# Patient Record
Sex: FEMALE | Race: WHITE | NOT HISPANIC OR LATINO | Employment: FULL TIME | URBAN - METROPOLITAN AREA
[De-identification: names, ages, dates, MRNs, and addresses within clinical notes are randomized per-mention and may not be internally consistent; named-entity substitution may affect disease eponyms.]

---

## 2017-01-30 ENCOUNTER — OFFICE VISIT (OUTPATIENT)
Dept: OBSTETRICS AND GYNECOLOGY | Facility: CLINIC | Age: 27
End: 2017-01-30
Payer: COMMERCIAL

## 2017-01-30 VITALS
BODY MASS INDEX: 21.63 KG/M2 | SYSTOLIC BLOOD PRESSURE: 114 MMHG | DIASTOLIC BLOOD PRESSURE: 74 MMHG | HEIGHT: 67 IN | WEIGHT: 137.81 LBS

## 2017-01-30 DIAGNOSIS — Z12.4 SCREENING FOR MALIGNANT NEOPLASM OF THE CERVIX: ICD-10-CM

## 2017-01-30 DIAGNOSIS — Z01.419 ROUTINE GYNECOLOGICAL EXAMINATION: Primary | ICD-10-CM

## 2017-01-30 PROCEDURE — 99999 PR PBB SHADOW E&M-EST. PATIENT-LVL II: CPT | Mod: PBBFAC,,, | Performed by: OBSTETRICS & GYNECOLOGY

## 2017-01-30 PROCEDURE — 88175 CYTOPATH C/V AUTO FLUID REDO: CPT

## 2017-01-30 PROCEDURE — 99395 PREV VISIT EST AGE 18-39: CPT | Mod: S$GLB,,, | Performed by: OBSTETRICS & GYNECOLOGY

## 2017-01-30 RX ORDER — CETIRIZINE HYDROCHLORIDE 10 MG/1
10 TABLET ORAL DAILY
COMMUNITY
End: 2018-05-04

## 2017-01-30 RX ORDER — NORGESTIMATE AND ETHINYL ESTRADIOL 0.25-0.035
1 KIT ORAL DAILY
Qty: 84 TABLET | Refills: 3 | Status: SHIPPED | OUTPATIENT
Start: 2017-01-30 | End: 2017-12-11 | Stop reason: SDUPTHER

## 2017-01-30 NOTE — PROGRESS NOTES
"OBSTETRIC HISTORY:   OB History      Para Term  AB TAB SAB Ectopic Multiple Living    0                  COMPREHENSIVE GYN HISTORY:  PAP History: Denies abnormal Paps.  Infection History: Reports STDs: Genital warts. Denies PID.  Benign History: Denies uterine fibroids. Denies ovarian cysts. Denies endometriosis.   Cancer History: Denies cervical cancer. Denies uterine cancer or hyperplasia. Denies ovarian cancer. Denies vulvar cancer or pre-cancer. Denies vaginal cancer or pre-cancer. Denies breast cancer. Denies colon cancer.  Sexual Activity History:  reports that she currently engages in sexual activity.   Menstrual History:  Every 28 days, flows for 4 days. Moderate flow.  Dysmenorrhea History: Reports severe dysmenorrhea.       HPI:   26 y.o.  Patient's last menstrual period was 2017.   Patient is  here for her annual gynecologic exam.  She has no complaints. She denies bladder, breast and bowel complaints.    ROS:  GENERAL: Denies weight gain or weight loss. Feeling well overall.   SKIN: Denies rash or lesions.   HEAD: Denies headache.   NODES: Denies enlarged lymph nodes.   CHEST: Denies shortness of breath.   ABDOMEN: No abdominal pain, constipation, diarrhea, nausea, vomiting or rectal bleeding.   URINARY: No frequency, dysuria, hematuria, or burning on urination.  REPRODUCTIVE: See HPI.   BREASTS: The patient denies pain, lumps, or nipple discharge.   HEMATOLOGIC: No easy bruisability.   MUSCULOSKELETAL: Denies joint pain or back pain.   NEUROLOGIC: Denies weakness.   PSYCHIATRIC: Denies depression, anxiety or mood swings.    PE:   Visit Vitals    /74    Ht 5' 7" (1.702 m)    Wt 62.5 kg (137 lb 12.6 oz)    LMP 2017    BMI 21.58 kg/m2     APPEARANCE: Well nourished, well developed, in no acute distress.  NECK: Neck symmetric without  thyromegaly.  NODES: No inguinal, cervical lymph node enlargement.  CHEST: Lungs clear to auscultation.  HEART: Regular rate and rhythm, " no murmurs, rubs or gallops.  ABDOMEN: Soft. No tenderness or masses. No hernias.  BREASTS: Symmetrical, no skin changes or visible lesions. No palpable masses, nipple discharge or adenopathy bilaterally.  PELVIC:   VULVA: No lesions. Normal female genitalia.  URETHRAL MEATUS: Normal size and location, no lesions, no prolapse.  URETHRA: No masses, tenderness, prolapse or scarring.  VAGINA: Moist and well rugated, no discharge, no significant cystocele or rectocele.  CERVIX: No lesions and discharge.  UTERUS: Normal size, regular shape, mobile, non-tender, bladder base nontender.  ADNEXA: No masses or tenderness.    PROCEDURES:  Pap smear    Assessment:  Normal Gynecologic Exam    Plan:  Mammogram and Colonoscopy if indicated by current recommendations.  Return to clinic in one year or for any problems or complaints.    Counseling:  Patient was counseled today on A.C.S. Pap guidelines and recommendations for yearly pelvic exams and monthly self breast exams; to see her PCP for other health maintenance. Regular exercise and healthy diet.

## 2017-12-11 RX ORDER — NORGESTIMATE AND ETHINYL ESTRADIOL 0.25-0.035
1 KIT ORAL DAILY
Qty: 84 TABLET | Refills: 0 | Status: SHIPPED | OUTPATIENT
Start: 2017-12-11 | End: 2018-02-02

## 2017-12-11 NOTE — TELEPHONE ENCOUNTER
Spoke to patient informed her that OCPs were sent to her pharmacy, informed her that her annual was due in two months if she wanted to set up appointment, refuse and said she will set one up via patient portal, I did inform the patient that is she did not schedule an annual you will not be able to send any more refills.

## 2017-12-11 NOTE — TELEPHONE ENCOUNTER
Received request for 3 month refill of OCP and due for annual in about 2 months. Needs to make appt soon.

## 2018-02-02 ENCOUNTER — OFFICE VISIT (OUTPATIENT)
Dept: OBSTETRICS AND GYNECOLOGY | Facility: CLINIC | Age: 28
End: 2018-02-02
Payer: COMMERCIAL

## 2018-02-02 VITALS
WEIGHT: 136.69 LBS | SYSTOLIC BLOOD PRESSURE: 141 MMHG | DIASTOLIC BLOOD PRESSURE: 90 MMHG | HEIGHT: 67 IN | BODY MASS INDEX: 21.45 KG/M2

## 2018-02-02 DIAGNOSIS — Z01.419 WELL WOMAN EXAM WITH ROUTINE GYNECOLOGICAL EXAM: Primary | ICD-10-CM

## 2018-02-02 DIAGNOSIS — Z12.4 SCREENING FOR CERVICAL CANCER: ICD-10-CM

## 2018-02-02 PROCEDURE — 99999 PR PBB SHADOW E&M-EST. PATIENT-LVL III: CPT | Mod: PBBFAC,,, | Performed by: OBSTETRICS & GYNECOLOGY

## 2018-02-02 PROCEDURE — 99395 PREV VISIT EST AGE 18-39: CPT | Mod: S$GLB,,, | Performed by: OBSTETRICS & GYNECOLOGY

## 2018-02-02 PROCEDURE — 88175 CYTOPATH C/V AUTO FLUID REDO: CPT

## 2018-02-02 RX ORDER — NORETHINDRONE ACETATE AND ETHINYL ESTRADIOL 1MG-20(21)
1 KIT ORAL DAILY
Qty: 28 TABLET | Refills: 2 | Status: SHIPPED | OUTPATIENT
Start: 2018-02-02 | End: 2021-10-19 | Stop reason: CLARIF

## 2018-02-02 NOTE — PROGRESS NOTES
OBSTETRIC HISTORY:   OB History      Para Term  AB Living    0              SAB TAB Ectopic Multiple Live Births                      COMPREHENSIVE GYN HISTORY:  PAP History: Denies abnormal Paps.  Infection History: Reports STDs: Genital warts. Denies PID.  Benign History: Denies uterine fibroids. Denies ovarian cysts. Denies endometriosis.   Cancer History: Denies cervical cancer. Denies uterine cancer or hyperplasia. Denies ovarian cancer. Denies vulvar cancer or pre-cancer. Denies vaginal cancer or pre-cancer. Denies breast cancer. Denies colon cancer.  Sexual Activity History:  reports that she currently engages in sexual activity.   Menstrual History:  Every 28 days, flows for 4 days. Moderate flow.  Dysmenorrhea History: Reports severe dysmenorrhea.      HPI:   27 y.o.  Patient's last menstrual period was 2018 (approximate).   Patient is  here for her annual gynecologic exam.  She has no complaints but her blood pressure was elevated. States it was elevated when she had to have her toe drained but assumed it was from pain. Initial /90 then 160/100 then final BP was 134/86. +history of HTN. Discussed options including trying a lower dose pill and check BP in 3 months, IUD, nexplanon, condoms or even Progestin pill. She denies bladder, breast and bowel complaints.    ROS:  GENERAL: Denies weight gain or weight loss. Feeling well overall.   SKIN: Denies rash or lesions.   HEAD: Denies headache.   NODES: Denies enlarged lymph nodes.   CHEST: Denies shortness of breath.   ABDOMEN: No abdominal pain, constipation, diarrhea, nausea, vomiting or rectal bleeding.   URINARY: No frequency, dysuria, hematuria, or burning on urination.  REPRODUCTIVE: See HPI.   BREASTS: The patient denies pain, lumps, or nipple discharge.   HEMATOLOGIC: No easy bruisability.   MUSCULOSKELETAL: Denies joint pain or back pain.   NEUROLOGIC: Denies weakness.   PSYCHIATRIC: Denies depression, anxiety or mood  "swings.    PE:   BP (!) 141/90   Ht 5' 7" (1.702 m)   Wt 62 kg (136 lb 11 oz)   LMP 01/17/2018 (Approximate)   BMI 21.41 kg/m²   APPEARANCE: Well nourished, well developed, in no acute distress.  NECK: Neck symmetric without  thyromegaly.  NODES: No inguinal, cervical lymph node enlargement.  CHEST: Lungs clear to auscultation.  HEART: Regular rate and rhythm, no murmurs, rubs or gallops.  ABDOMEN: Soft. No tenderness or masses. No hernias.  BREASTS: Symmetrical, no skin changes or visible lesions. No palpable masses, nipple discharge or adenopathy bilaterally.  PELVIC:   VULVA: No lesions. Normal female genitalia.  URETHRAL MEATUS: Normal size and location, no lesions, no prolapse.  URETHRA: No masses, tenderness, prolapse or scarring.  VAGINA: Moist and well rugated, no discharge, no significant cystocele or rectocele.  CERVIX: No lesions and discharge.  UTERUS: Normal size, regular shape, mobile, non-tender, bladder base nontender.  ADNEXA: No masses or tenderness.    PROCEDURES:  Pap smear    Assessment:  Normal Gynecologic Exam  Questionable HTN--will start low dose OCP and RTO 3 months to recheck BP. If gets headaches will need to check BP at home.    Plan:  Mammogram and Colonoscopy if indicated by current recommendations.  Return to clinic in one year or for any problems or complaints.    Counseling:  Patient was counseled today on A.C.S. Pap guidelines and recommendations for yearly pelvic exams and monthly self breast exams; to see her PCP for other health maintenance. Regular exercise and healthy diet.          "

## 2018-03-06 ENCOUNTER — TELEPHONE (OUTPATIENT)
Dept: OBSTETRICS AND GYNECOLOGY | Facility: CLINIC | Age: 28
End: 2018-03-06

## 2018-03-06 RX ORDER — NORGESTIMATE AND ETHINYL ESTRADIOL 0.25-0.035
1 KIT ORAL DAILY
Qty: 84 TABLET | Refills: 3 | OUTPATIENT
Start: 2018-03-06

## 2018-03-06 RX ORDER — NORETHINDRONE ACETATE AND ETHINYL ESTRADIOL 1MG-20(21)
1 KIT ORAL DAILY
Qty: 28 TABLET | Refills: 2 | Status: CANCELLED | OUTPATIENT
Start: 2018-03-06 | End: 2019-03-06

## 2018-03-06 RX ORDER — NORETHINDRONE ACETATE AND ETHINYL ESTRADIOL 1MG-20(21)
1 KIT ORAL DAILY
Qty: 84 TABLET | Refills: 3 | Status: CANCELLED | OUTPATIENT
Start: 2018-03-06

## 2018-03-06 NOTE — TELEPHONE ENCOUNTER
Please verify correct medication because Estrylla was requested medication but Junel was sent in February

## 2018-03-06 NOTE — TELEPHONE ENCOUNTER
Spoke with [facility] pharmacy.  The Estrylla was an old prescription that was on an automatic refill.   Pharmacy is able to see that patient picked up Junel Fe 1/20 refill recently and in February at another [facility]

## 2018-03-06 NOTE — TELEPHONE ENCOUNTER
E-Prescribing Error received for 90 refill request for patient birthcontrol pills.  Patient was recently seen on 2/2/2018 for her annual exam.  A prescription was sent for 28 day supply with only 2 additional refills; patient is requesting a 90 days supply.  Please advise

## 2018-03-06 NOTE — TELEPHONE ENCOUNTER
Sorry just realized why only given one pack. Blood pressures were very elevated at appointment and that is why we switched OCP. Patient is to return late April early May for blood pressure check.

## 2018-04-10 ENCOUNTER — PATIENT MESSAGE (OUTPATIENT)
Dept: OBSTETRICS AND GYNECOLOGY | Facility: CLINIC | Age: 28
End: 2018-04-10

## 2018-04-10 RX ORDER — NORETHINDRONE ACETATE AND ETHINYL ESTRADIOL 1MG-20(21)
1 KIT ORAL DAILY
Qty: 28 TABLET | Refills: 2 | Status: CANCELLED | OUTPATIENT
Start: 2018-04-10 | End: 2019-04-10

## 2018-04-27 ENCOUNTER — PATIENT MESSAGE (OUTPATIENT)
Dept: OBSTETRICS AND GYNECOLOGY | Facility: CLINIC | Age: 28
End: 2018-04-27

## 2018-04-27 NOTE — TELEPHONE ENCOUNTER
Pt came in for BP check today. Initial systolic BP was 162, I didn't finish to get diastolic. I let patient rest in a quiet room for about 10 minutes and rechecked. The second BP was 152/98. Pt notified that she should hear something Monday about what the next step was. Pt states she has two active pills left and then a week of placebo pills.

## 2018-04-27 NOTE — TELEPHONE ENCOUNTER
----- Message from Ilsa Edwards sent at 4/27/2018  9:31 AM CDT -----  Contact: Self/ [phone]  Patient called in to be seen today if possible. Patient was told her blood pressure needed to be checked before allowing refill medication.    Please call and advise.     norethindrone-ethinyl estradiol (JUNEL FE 1/20) 1 mg-20 mcg (21)/75 mg (7) per tablet

## 2018-04-29 NOTE — TELEPHONE ENCOUNTER
Notify blood pressure still too high for combination birth control. Options would be implantable jaison, iud, condoms, progestin only pill ot Depo provera.

## 2018-04-30 ENCOUNTER — PATIENT MESSAGE (OUTPATIENT)
Dept: OBSTETRICS AND GYNECOLOGY | Facility: CLINIC | Age: 28
End: 2018-04-30

## 2018-05-01 RX ORDER — NORETHINDRONE ACETATE AND ETHINYL ESTRADIOL 1MG-20(21)
1 KIT ORAL DAILY
Qty: 28 TABLET | Refills: 2 | OUTPATIENT
Start: 2018-05-01 | End: 2019-05-01

## 2018-05-04 ENCOUNTER — OFFICE VISIT (OUTPATIENT)
Dept: OBSTETRICS AND GYNECOLOGY | Facility: CLINIC | Age: 28
End: 2018-05-04
Payer: COMMERCIAL

## 2018-05-04 VITALS
BODY MASS INDEX: 20.83 KG/M2 | WEIGHT: 132.69 LBS | DIASTOLIC BLOOD PRESSURE: 84 MMHG | HEIGHT: 67 IN | SYSTOLIC BLOOD PRESSURE: 120 MMHG

## 2018-05-04 DIAGNOSIS — Z30.09 ENCOUNTER FOR COUNSELING REGARDING INITIATION OF OTHER CONTRACEPTIVE MEASURE: Primary | ICD-10-CM

## 2018-05-04 PROCEDURE — 99999 PR PBB SHADOW E&M-EST. PATIENT-LVL III: CPT | Mod: PBBFAC,,, | Performed by: OBSTETRICS & GYNECOLOGY

## 2018-05-04 PROCEDURE — 99211 OFF/OP EST MAY X REQ PHY/QHP: CPT | Mod: S$GLB,,, | Performed by: OBSTETRICS & GYNECOLOGY

## 2018-05-04 NOTE — PROGRESS NOTES
"OBSTETRIC HISTORY:   OB History      Para Term  AB Living    0              SAB TAB Ectopic Multiple Live Births                      COMPREHENSIVE GYN HISTORY:  PAP History: Denies abnormal Paps.  Infection History: Reports STDs: Genital warts. Denies PID.  Benign History: Denies uterine fibroids. Denies ovarian cysts. Denies endometriosis.   Cancer History: Denies cervical cancer. Denies uterine cancer or hyperplasia. Denies ovarian cancer. Denies vulvar cancer or pre-cancer. Denies vaginal cancer or pre-cancer. Denies breast cancer. Denies colon cancer.  Sexual Activity History:  reports that she currently engages in sexual activity.   Menstrual History:  Every 28 days, flows for 4 days. Moderate flow.  Dysmenorrhea History: Reports severe dysmenorrhea.      HPI:   27 y.o.  Patient's last menstrual period was 2018.   Patient is  here to discuss birth control with recent elevations in blood pressure. Today it is fine but when she gets stressed it is very elevated and discussed that would have same high risks as having it all the time. After discussing different birth control she would like the Nexplanon.  She denies bladder, breast and bowel complaints.    ROS:  GENERAL: Denies weight gain or weight loss. Feeling well overall.   SKIN: Denies rash or lesions.   HEAD: Denies headache.   NODES: Denies enlarged lymph nodes.   CHEST: Denies shortness of breath.   ABDOMEN: No abdominal pain, constipation, diarrhea, nausea, vomiting or rectal bleeding.   URINARY: No frequency, dysuria, hematuria, or burning on urination.  REPRODUCTIVE: See HPI.   BREASTS: The patient denies pain, lumps, or nipple discharge.   HEMATOLOGIC: No easy bruisability.   MUSCULOSKELETAL: Denies joint pain or back pain.   NEUROLOGIC: Denies weakness.   PSYCHIATRIC: Denies depression, anxiety or mood swings.    PE:   /84 (BP Location: Right arm)   Ht 5' 7" (1.702 m)   Wt 60.2 kg (132 lb 11.5 oz)   LMP 2018   BMI " 20.79 kg/m²   APPEARANCE: Well nourished, well developed, in no acute distress.  Talk only 5 minutes      ASSESSMENT:  Contraceptive counseling    PLAN:  RTO once product is received.

## 2018-05-11 ENCOUNTER — PATIENT MESSAGE (OUTPATIENT)
Dept: OBSTETRICS AND GYNECOLOGY | Facility: CLINIC | Age: 28
End: 2018-05-11

## 2021-04-15 ENCOUNTER — PATIENT MESSAGE (OUTPATIENT)
Dept: RESEARCH | Facility: HOSPITAL | Age: 31
End: 2021-04-15

## 2022-04-28 LAB — ANTE RHIG: NORMAL

## 2024-11-04 ENCOUNTER — PATIENT MESSAGE (OUTPATIENT)
Dept: PRIMARY CARE CLINIC | Facility: CLINIC | Age: 34
End: 2024-11-04

## 2024-11-04 ENCOUNTER — OFFICE VISIT (OUTPATIENT)
Dept: PRIMARY CARE CLINIC | Facility: CLINIC | Age: 34
End: 2024-11-04
Payer: COMMERCIAL

## 2024-11-04 VITALS
HEIGHT: 66 IN | HEART RATE: 76 BPM | SYSTOLIC BLOOD PRESSURE: 128 MMHG | OXYGEN SATURATION: 98 % | WEIGHT: 146.19 LBS | DIASTOLIC BLOOD PRESSURE: 72 MMHG | BODY MASS INDEX: 23.49 KG/M2

## 2024-11-04 DIAGNOSIS — F41.1 GAD (GENERALIZED ANXIETY DISORDER): ICD-10-CM

## 2024-11-04 DIAGNOSIS — Z00.00 WELLNESS EXAMINATION: Primary | ICD-10-CM

## 2024-11-04 PROCEDURE — 99999 PR PBB SHADOW E&M-EST. PATIENT-LVL III: CPT | Mod: PBBFAC,,, | Performed by: INTERNAL MEDICINE

## 2024-11-04 RX ORDER — ALPRAZOLAM 0.25 MG/1
0.25 TABLET ORAL DAILY PRN
Qty: 30 TABLET | Refills: 0 | Status: SHIPPED | OUTPATIENT
Start: 2024-11-04 | End: 2024-12-04

## 2024-11-04 RX ORDER — SERTRALINE HYDROCHLORIDE 25 MG/1
25 TABLET, FILM COATED ORAL DAILY
Qty: 90 TABLET | Refills: 3 | Status: SHIPPED | OUTPATIENT
Start: 2024-11-04 | End: 2025-11-04

## 2024-11-04 NOTE — PROGRESS NOTES
[facility] Note    Chief Complaint      Chief Complaint   Patient presents with    Annual Exam    Depression    Establish Care     History of Present Illness      Radha Colvin is a 34 y.o. female who presents today for chief complaint annual wellness . HPI   Annual feels well, prior PCP Adelina No   DARRIN she feels worke dup and shaky at times, she has used someone elses prn clonazapm which has helped. She has been on celexa in the past but dc'd after only a few weeks due to pregnancy, she felt it reduced her tolerance to alcohol, shes had some big life changes, she had a baby, she quit her job. She feels her worst perimenstrual, some component of PMDD    Gyn Herbert 3.23    Active Problem List with Overview Notes    Diagnosis Date Noted    Tender lymph node 2015       Health Maintenance   Topic Date Due    TETANUS VACCINE  06/15/2032    Hepatitis C Screening  Completed    Lipid Panel  Completed       Past Medical History:   Diagnosis Date    Convulsions 2020 1:57:24 PM    [facility] Historical - Unknown: Seizure-No Additional Notes    Convulsions 2020 1:57:24 PM    [facility] Historical - Unknown: Seizure-No Additional Notes    Depression     Essential hypertension 2020 1:57:15 PM    [facility] Historical - Cardiovascular: Hypertension-No Additional Notes    Essential hypertension 2020 1:57:15 PM    [facility] Historical - Cardiovascular: Hypertension-No Additional Notes    Other depressive disorder 2021 9:13:03 AM    [facility] Historical - [facility]: Depression-No Additional Notes    Other depressive disorder 2021 9:13:03 AM    [facility] Historical - [facility]: Depression-No Additional Notes    Seizures     isolated after viral encephalitis in high school       Past Surgical History:   Procedure Laterality Date     SECTION      WISDOM TOOTH EXTRACTION         family history includes Breast cancer in her paternal grandmother; Cancer in her  "maternal grandmother and paternal grandfather; Stroke in her maternal grandmother; Suicide in her maternal grandfather.    Social History     Tobacco Use    Smoking status: Never    Smokeless tobacco: Never   Substance Use Topics    Alcohol use: Yes     Comment: Socially    Drug use: No       Review of Systems   Constitutional:  Negative for chills, fever, malaise/fatigue and weight loss.   Respiratory:  Negative for cough, sputum production, shortness of breath and wheezing.    Cardiovascular:  Negative for chest pain, palpitations, orthopnea and leg swelling.   Gastrointestinal:  Negative for constipation, diarrhea, nausea and vomiting.   Genitourinary:  Negative for dysuria, frequency, hematuria and urgency.        Outpatient Encounter Medications as of 11/4/2024   Medication Sig Dispense Refill    ALPRAZolam (XANAX) 0.25 MG tablet Take 1 tablet (0.25 mg total) by mouth daily as needed for Anxiety. 30 tablet 0    sertraline (ZOLOFT) 25 MG tablet Take 1 tablet (25 mg total) by mouth once daily. 90 tablet 3     No facility-administered encounter medications on file as of 11/4/2024.        Review of patient's allergies indicates:   Allergen Reactions    Demerol [meperidine] Hives           Physical Exam      Vital Signs  Pulse: 76  SpO2: 98 %  BP: 128/72  BP Location: Right arm  Patient Position: Sitting  Height and Weight  Height: 5' 6" (167.6 cm)  Weight: 66.3 kg (146 lb 2.6 oz)  BSA (Calculated - sq m): 1.76 sq meters  BMI (Calculated): 23.6  Weight in (lb) to have BMI = 25: 154.6]    Physical Exam  Vitals reviewed.   Constitutional:       General: She is not in acute distress.     Appearance: She is well-developed. She is not diaphoretic.   HENT:      Head: Normocephalic and atraumatic.      Right Ear: External ear normal.      Left Ear: External ear normal.      Nose: Nose normal.   Eyes:      General:         Right eye: No discharge.         Left eye: No discharge.      Conjunctiva/sclera: Conjunctivae normal. " "  Cardiovascular:      Rate and Rhythm: Normal rate and regular rhythm.      Heart sounds: Normal heart sounds.   Pulmonary:      Effort: Pulmonary effort is normal. No respiratory distress.      Breath sounds: Normal breath sounds.   Musculoskeletal:         General: Normal range of motion.      Cervical back: Normal range of motion.   Skin:     Coloration: Skin is not pale.      Findings: No rash.   Neurological:      Mental Status: She is alert and oriented to person, place, and time.   Psychiatric:         Behavior: Behavior normal.         Thought Content: Thought content normal.          Laboratory:  CBC:  No results for input(s): "WBC", "RBC", "HGB", "HCT", "PLT", "MCV", "MCH", "MCHC" in the last 2160 hours.  CMP:  No results for input(s): "GLU", "CALCIUM", "ALBUMIN", "PROT", "NA", "K", "CO2", "CL", "BUN", "ALKPHOS", "ALT", "AST", "BILITOT" in the last 2160 hours.    Invalid input(s): "CREATININ"  URINALYSIS:  No results for input(s): "COLORU", "CLARITYU", "SPECGRAV", "PHUR", "PROTEINUA", "GLUCOSEU", "BILIRUBINCON", "BLOODU", "WBCU", "RBCU", "BACTERIA", "MUCUS", "NITRITE", "LEUKOCYTESUR", "UROBILINOGEN", "HYALINECASTS" in the last 2160 hours.   LIPIDS:  No results for input(s): "TSH", "HDL", "CHOL", "TRIG", "LDLCALC", "CHOLHDL", "NONHDLCHOL", "TOTALCHOLEST" in the last 2160 hours.  TSH:  No results for input(s): "TSH" in the last 2160 hours.  A1C:  No results for input(s): "HGBA1C" in the last 2160 hours.    Radiology:      Assessment/Plan     Radha Colvin is a 34 y.o.female with:    1. Wellness examination  -     Lipid Panel; Future; Expected date: 11/04/2024  -     Comprehensive Metabolic Panel; Future; Expected date: 11/04/2024  -     CBC Without Differential; Future; Expected date: 11/04/2024    2. DARRIN (generalized anxiety disorder)  -     sertraline (ZOLOFT) 25 MG tablet; Take 1 tablet (25 mg total) by mouth once daily.  Dispense: 90 tablet; Refill: 3  -     ALPRAZolam (XANAX) 0.25 MG tablet; Take 1 " tablet (0.25 mg total) by mouth daily as needed for Anxiety.  Dispense: 30 tablet; Refill: 0  -     TSH; Future; Expected date: 11/04/2024         Use of the [facility] Patient Portal discussed and encouraged during today's visit  -Continue current medications and maintain follow up with specialists.   No future appointments.    Hattie Post MD  11/4/2024 11:58 AM    Primary Care Internal Medicine

## 2024-12-11 ENCOUNTER — TELEPHONE (OUTPATIENT)
Dept: PRIMARY CARE CLINIC | Facility: CLINIC | Age: 34
End: 2024-12-11
Payer: COMMERCIAL

## 2024-12-11 DIAGNOSIS — F41.1 GAD (GENERALIZED ANXIETY DISORDER): ICD-10-CM

## 2024-12-11 RX ORDER — ALPRAZOLAM 0.25 MG/1
0.25 TABLET ORAL DAILY PRN
Qty: 30 TABLET | Refills: 0 | Status: SHIPPED | OUTPATIENT
Start: 2024-12-11 | End: 2025-01-10

## 2024-12-11 NOTE — TELEPHONE ENCOUNTER
Spoke to pt. Pt states she has been on Zoloft 25 mg daily for 1 month now and has been having bad headaches. She is almost 100% sure it's from the Zoloft and wondering if she should try 1/2 tab instead. Pt scheduled for f/u with Dr. KC on 12/26. Pt also requesting rx on Xanax. Please advise for Dr. KC?

## 2024-12-11 NOTE — TELEPHONE ENCOUNTER
----- Message from Janice sent at 12/11/2024  8:47 AM CST -----  Contact: [phone] .[email or web address]     Patient is calling for Medical Advice regarding: pt would like a call back. States that she has a medical question. Would not disclose.     How long has patient had these symptoms:    Pharmacy name and phone#:    Patient wants a call back or thru [facility]:    Comments:    Please advise patient replies from provider may take up to 48 hours.

## 2024-12-11 NOTE — TELEPHONE ENCOUNTER
Refilled xanax  Ok to try 1/2 tablet though unlikely to help with depression/anxiety as this is not a therapeutic dose. Can discuss different medication with Dr KC at upcoming appt if needed

## 2024-12-26 ENCOUNTER — OFFICE VISIT (OUTPATIENT)
Dept: PRIMARY CARE CLINIC | Facility: CLINIC | Age: 34
End: 2024-12-26
Payer: COMMERCIAL

## 2024-12-26 ENCOUNTER — PATIENT MESSAGE (OUTPATIENT)
Dept: PRIMARY CARE CLINIC | Facility: CLINIC | Age: 34
End: 2024-12-26

## 2024-12-26 DIAGNOSIS — F41.1 GAD (GENERALIZED ANXIETY DISORDER): ICD-10-CM

## 2024-12-26 PROCEDURE — 99214 OFFICE O/P EST MOD 30 MIN: CPT | Mod: 95,,, | Performed by: INTERNAL MEDICINE

## 2024-12-26 RX ORDER — SERTRALINE HYDROCHLORIDE 50 MG/1
50 TABLET, FILM COATED ORAL DAILY
Qty: 90 TABLET | Refills: 3 | Status: SHIPPED | OUTPATIENT
Start: 2024-12-26 | End: 2025-12-26

## 2024-12-26 NOTE — PROGRESS NOTES
[facility] Note  The patient location is: [place]  The chief complaint leading to consultation is: mdd adelaida     Visit type: audiovisual    Face to Face time with patient: 10  10 minutes of total time spent on the encounter, which includes face to face time and non-face to face time preparing to see the patient (eg, review of tests), Obtaining and/or reviewing separately obtained history, Documenting clinical information in the electronic or other health record, Independently interpreting results (not separately reported) and communicating results to the patient/family/caregiver, or Care coordination (not separately reported).         Each patient to whom he or she provides medical services by telemedicine is:  (1) informed of the relationship between the physician and patient and the respective role of any other health care provider with respect to management of the patient; and (2) notified that he or she may decline to receive medical services by telemedicine and may withdraw from such care at any time.    Notes:     Chief Complaint    No chief complaint on file.    History of Present Illness      Radha Colvin is a 34 y.o. female who presents today for chief complaint .     HPI     She sees a therapist Vera Ivey sees her q2 weeks, she is taking zoloft   Having some family struggles  Still feeling very depressed   Active Problem List with Overview Notes    Diagnosis Date Noted    Tender lymph node 03/25/2015       Health Maintenance   Topic Date Due    Influenza Vaccine (1) 09/01/2024    COVID-19 Vaccine (3 - 2024-25 season) 09/01/2024    Cervical Cancer Screening  03/27/2028    TETANUS VACCINE  06/15/2032    RSV Vaccine (Age 60+ and Pregnant patients) (1 - 1-dose 75+ series) 07/30/2063    Hepatitis C Screening  Completed    HIV Screening  Completed    Lipid Panel  Completed    Pneumococcal Vaccines (Age 0-49)  Aged Out       Past Medical History:   Diagnosis Date    Convulsions 9/22/2020  "1:57:24 PM    [facility] Historical - Unknown: Seizure-No Additional Notes    Convulsions 2020 1:57:24 PM    [facility] Historical - Unknown: Seizure-No Additional Notes    Depression     Essential hypertension 2020 1:57:15 PM    [facility] - Cardiovascular: Hypertension-No Additional Notes    Essential hypertension 2020 1:57:15 PM    [facility] - Cardiovascular: Hypertension-No Additional Notes    Other depressive disorder 2021 9:13:03 AM    [facility] - [facility]: Depression-No Additional Notes    Other depressive disorder 2021 9:13:03 AM    [facility] - [facility]: Depression-No Additional Notes    Seizures     isolated after viral encephalitis in high school       Past Surgical History:   Procedure Laterality Date     SECTION      WISDOM TOOTH EXTRACTION         family history includes Breast cancer in her paternal grandmother; Cancer in her maternal grandmother and paternal grandfather; Stroke in her maternal grandmother; Suicide in her maternal grandfather.    Social History     Tobacco Use    Smoking status: Never    Smokeless tobacco: Never   Substance Use Topics    Alcohol use: Yes     Comment: Socially    Drug use: No       ROS     Outpatient Encounter Medications as of 2024   Medication Sig Dispense Refill    ALPRAZolam (XANAX) 0.25 MG tablet Take 1 tablet (0.25 mg total) by mouth daily as needed for Anxiety. 30 tablet 0    sertraline (ZOLOFT) 50 MG tablet Take 1 tablet (50 mg total) by mouth once daily. 90 tablet 3    [DISCONTINUED] sertraline (ZOLOFT) 25 MG tablet Take 1 tablet (25 mg total) by mouth once daily. 90 tablet 3     No facility-administered encounter medications on file as of 2024.        Review of patient's allergies indicates:   Allergen Reactions    Demerol [meperidine] Hives           Physical Exam       ]    Physical Exam     Laboratory:  CBC:  No results for input(s): "WBC", "RBC", "HGB", "HCT", " ""PLT", "MCV", "MCH", "MCHC" in the last 2160 hours.  CMP:  No results for input(s): "GLU", "CALCIUM", "ALBUMIN", "PROT", "NA", "K", "CO2", "CL", "BUN", "ALKPHOS", "ALT", "AST", "BILITOT" in the last 2160 hours.    Invalid input(s): "CREATININ"  URINALYSIS:  No results for input(s): "COLORU", "CLARITYU", "SPECGRAV", "PHUR", "PROTEINUA", "GLUCOSEU", "BILIRUBINCON", "BLOODU", "WBCU", "RBCU", "BACTERIA", "MUCUS", "NITRITE", "LEUKOCYTESUR", "UROBILINOGEN", "HYALINECASTS" in the last 2160 hours.   LIPIDS:  No results for input(s): "TSH", "HDL", "CHOL", "TRIG", "LDLCALC", "CHOLHDL", "NONHDLCHOL", "TOTALCHOLEST" in the last 2160 hours.  TSH:  No results for input(s): "TSH" in the last 2160 hours.  A1C:  No results for input(s): "HGBA1C" in the last 2160 hours.    Radiology:  `    Assessment/Plan     Radha Colvin is a 34 y.o.female with:    1. DARRIN (generalized anxiety disorder)  -     sertraline (ZOLOFT) 50 MG tablet; Take 1 tablet (50 mg total) by mouth once daily.  Dispense: 90 tablet; Refill: 3  -     Ambulatory referral/consult to Psychiatry; Future; Expected date: 01/02/2025      Close follow up with NP AC    Use of the [facility] Patient Portal discussed and encouraged during today's visit  -Continue current medications and maintain follow up with specialists.  .  No future appointments.    Hattie Post MD  12/26/2024 11:59 AM    Primary Care Internal Medicine                     "

## 2025-01-17 ENCOUNTER — OFFICE VISIT (OUTPATIENT)
Dept: PRIMARY CARE CLINIC | Facility: CLINIC | Age: 35
End: 2025-01-17
Payer: COMMERCIAL

## 2025-01-17 DIAGNOSIS — F41.1 GAD (GENERALIZED ANXIETY DISORDER): Primary | ICD-10-CM

## 2025-01-17 NOTE — PROGRESS NOTES
[facility] Virtual Visit Note    The patient location is: [place]  The chief complaint leading to consultation is: anxiety  Visit type: Virtual visit with synchronous audio and video  Total time spent with patient: 14  Each patient to whom he or she provides medical services by telemedicine is:  (1) informed of the relationship between the physician and patient and the respective role of any other health care provider with respect to management of the patient; and (2) notified that he or she may decline to receive medical services by telemedicine and may withdraw from such care at any time.      Chief Complaint    No chief complaint on file.      History of Present Illness      Radha Colvin is a 34 y.o. female with chronic conditions of convulsions, htn, depression,  who presents today for: follow up. Recently increased Zoloft to 50mg on 24 by Dr. Post. Still seeing therapists weekly. Feels like it is working well for her, but has had to take xanax for panic attacks.    Past Medical History:  Past Medical History:   Diagnosis Date    Convulsions 2020 1:57:24 PM    [facility] Historical - Unknown: Seizure-No Additional Notes    Convulsions 2020 1:57:24 PM    [facility] Historical - Unknown: Seizure-No Additional Notes    Depression     Essential hypertension 2020 1:57:15 PM    [facility] Historical - Cardiovascular: Hypertension-No Additional Notes    Essential hypertension 2020 1:57:15 PM    [facility] Historical - Cardiovascular: Hypertension-No Additional Notes    Other depressive disorder 2021 9:13:03 AM    [facility] Historical - [facility]: Depression-No Additional Notes    Other depressive disorder 2021 9:13:03 AM    [facility] Historical - [facility]: Depression-No Additional Notes    Seizures     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    Family History:  family history includes Breast cancer in her paternal grandmother; Cancer in her maternal grandmother and paternal grandfather; Stroke in her maternal grandmother; Suicide in her maternal grandfather.     Social History:  Social History     Tobacco Use    Smoking status: Never    Smokeless tobacco: Never   Substance Use Topics    Alcohol use: Yes     Comment: Socially    Drug use: No       Review of Systems   Constitutional:  Negative for chills and fever.   HENT:  Negative for hearing loss.    Eyes:  Negative for discharge.   Respiratory:  Negative for cough, shortness of breath and wheezing.    Cardiovascular:  Negative for chest pain and palpitations.   Gastrointestinal:  Negative for blood in stool, constipation, diarrhea, nausea and vomiting.   Genitourinary:  Negative for dysuria and hematuria.   Musculoskeletal:  Negative for falls and neck pain.   Neurological:  Negative for weakness and headaches.   Endo/Heme/Allergies:  Negative for polydipsia.        Medications:  Outpatient Encounter Medications as of 1/17/2025   Medication Sig Dispense Refill    ALPRAZolam (XANAX) 0.25 MG tablet Take 1 tablet (0.25 mg total) by mouth daily as needed for Anxiety. 30 tablet 0    sertraline (ZOLOFT) 50 MG tablet Take 1 tablet (50 mg total) by mouth once daily. 90 tablet 3     No facility-administered encounter medications on file as of 1/17/2025.       Allergies:  Review of patient's allergies indicates:   Allergen Reactions    Demerol [meperidine] Hives       Health Maintenance:  Immunization History   Administered Date(s) Administered    COVID-19, MRNA, LN-S, PF (Pfizer) (Purple Cap) 08/02/2021, 08/23/2021    DTaP 1988, 1988, 01/09/1989, 04/23/1990, 07/19/1993    HPV Quadrivalent 05/03/2010, 07/08/2010, 01/21/2011    Hepatitis A, Adult 12/17/2014    Hepatitis B 04/22/1998, 03/09/1999    Hepatitis B, Adult 04/13/2001    Hepatitis B, Pediatric/Adolescent 04/22/1998    HiB PRP-T  04/23/1990    IPV 1988, 1988, 02/06/1989, 04/23/1990, 07/19/1993    Influenza 10/09/2015    Influenza - Quadrivalent - PF *Preferred* (6 months and older) 12/02/2021    Influenza - Trivalent - Afluria, Fluzone MDV 10/02/2013    Influenza - Trivalent - Fluarix, Flulaval, Fluzone, Afluria - PF 10/09/2015    Influenza Split 10/02/2013    MMR 04/23/1990, 07/19/1993    Meningococcal Conjugate (MCV4P) 06/22/2006, 08/01/2012    PPD Test 08/01/1994, 03/09/1999    Td (ADULT) 06/24/1999    Tdap 06/24/1999, 07/11/2012    Typhoid - ViCPs 12/17/2014      Health Maintenance   Topic Date Due    Influenza Vaccine (1) 09/01/2024    COVID-19 Vaccine (3 - 2024-25 season) 09/01/2024    Cervical Cancer Screening  03/27/2028    TETANUS VACCINE  06/15/2032    RSV Vaccine (Age 60+ and Pregnant patients) (1 - 1-dose 75+ series) 07/30/2063    Hepatitis C Screening  Completed    HIV Screening  Completed    Lipid Panel  Completed    Pneumococcal Vaccines (Age 0-49)  Aged Out        Physical Exam       ]    Physical Exam  Neurological:      Mental Status: She is alert and oriented to person, place, and time.   Psychiatric:         Mood and Affect: Mood normal.          Laboratory:  CBC:  Recent Labs   Lab 06/23/22  1210 06/30/22  1154 07/28/22  1130   WBC 15.61 A 9.00 10.54 A   RBC 3.76 A 4.28 4.56   Hemoglobin 11.0 A 12.0 12.7   Hematocrit 33.1 A 37.4 39.0   Platelets 409 A 507 A 293   MCV 88.0 87.4 85.5   MCH 29.3 28.0 27.9   MCHC 33.2 32.1 32.6     CMP:  Recent Labs   Lab 06/16/22  0637 06/23/22  1210 07/28/22  1130   Glucose 106  --   --    Calcium 8.4  --   --    Albumin 3 L  --   --    Sodium 135  --   --    Potassium 4.1  --   --    CO2 18 L  --   --    BUN  --    < > 13   Blood Urea Nitrogen 12  --   --    Alkaline Phosphatase 181 H  --   --     H   < > 39 A    H   < > 34    < > = values in this interval not displayed.     URINALYSIS:       LIPIDS:      TSH:      A1C:        Assessment/Plan     Radha Colvin is  a 34 y.o.female with:    1. DARRIN (generalized anxiety disorder)     Pt will increase Zoloft to 75mg and will see if helps cut back on panic attacks/Xanax usage  Counseled to only use Xanax as needed  Rtc in 2-4 weeks.     Chronic conditions status updated as per HPI.  Other than changes above, cont current medications and maintain follow up with specialists.  No follow-ups on file.    No future appointments.      Joselyn Oropeza MD  [facility]      Answers submitted by the patient for this visit:  Review of Systems Questionnaire (Submitted on 1/17/2025)  activity change: No  unexpected weight change: No  rhinorrhea: No  trouble swallowing: No  visual disturbance: No  chest tightness: No  polyuria: No  difficulty urinating: No  menstrual problem: No  arthralgias: No  confusion: No  dysphoric mood: Yes

## 2025-03-17 DIAGNOSIS — F41.1 GAD (GENERALIZED ANXIETY DISORDER): ICD-10-CM

## 2025-03-20 RX ORDER — ALPRAZOLAM 0.25 MG/1
0.25 TABLET ORAL DAILY PRN
Qty: 30 TABLET | Refills: 0 | Status: SHIPPED | OUTPATIENT
Start: 2025-03-20 | End: 2025-04-19

## 2025-04-28 DIAGNOSIS — F41.1 GAD (GENERALIZED ANXIETY DISORDER): ICD-10-CM

## 2025-05-30 RX ORDER — ALPRAZOLAM 0.25 MG/1
0.25 TABLET ORAL DAILY PRN
Qty: 30 TABLET | Refills: 0 | OUTPATIENT
Start: 2025-05-30 | End: 2025-06-29